# Patient Record
Sex: MALE | URBAN - METROPOLITAN AREA
[De-identification: names, ages, dates, MRNs, and addresses within clinical notes are randomized per-mention and may not be internally consistent; named-entity substitution may affect disease eponyms.]

---

## 2022-06-14 ENCOUNTER — OFFICE (OUTPATIENT)
Dept: URBAN - METROPOLITAN AREA CLINIC 27 | Facility: CLINIC | Age: 57
Setting detail: OPHTHALMOLOGY
End: 2022-06-14
Payer: COMMERCIAL

## 2022-06-14 DIAGNOSIS — H52.4: ICD-10-CM

## 2022-06-14 DIAGNOSIS — H25.13: ICD-10-CM

## 2022-06-14 DIAGNOSIS — H43.813: ICD-10-CM

## 2022-06-14 DIAGNOSIS — H35.373: ICD-10-CM

## 2022-06-14 PROBLEM — H52.7 REFRACTIVE ERROR ; BOTH EYES: Status: ACTIVE | Noted: 2022-06-14

## 2022-06-14 PROCEDURE — 92004 COMPRE OPH EXAM NEW PT 1/>: CPT | Performed by: OPHTHALMOLOGY

## 2022-06-14 PROCEDURE — 92015 DETERMINE REFRACTIVE STATE: CPT | Performed by: OPHTHALMOLOGY

## 2022-06-14 PROCEDURE — 92134 CPTRZ OPH DX IMG PST SGM RTA: CPT | Performed by: OPHTHALMOLOGY

## 2022-06-14 ASSESSMENT — REFRACTION_CURRENTRX
OS_OVR_VA: 20/
OD_AXIS: 09
OD_OVR_VA: 20/
OS_AXIS: 173
OS_ADD: +2.25
OS_SPHERE: -3.25
OD_ADD: +2.50
OS_CYLINDER: +1.00
OD_SPHERE: -3.00
OD_CYLINDER: +1.00

## 2022-06-14 ASSESSMENT — TONOMETRY
OD_IOP_MMHG: 19
OS_IOP_MMHG: 17

## 2022-06-14 ASSESSMENT — KERATOMETRY
OD_K1POWER_DIOPTERS: 38.75
OS_K1POWER_DIOPTERS: 38.50
OS_AXISANGLE_DEGREES: 134
OS_K2POWER_DIOPTERS: 39.00
OD_K2POWER_DIOPTERS: 39.50
OD_AXISANGLE_DEGREES: 72

## 2022-06-14 ASSESSMENT — REFRACTION_AUTOREFRACTION
OS_CYLINDER: +1.75
OD_CYLINDER: +1.00
OD_AXIS: 19
OD_SPHERE: -2.75
OS_AXIS: 169
OS_SPHERE: -3.50

## 2022-06-14 ASSESSMENT — REFRACTION_MANIFEST
OS_ADD: +2.50
OS_AXIS: 170
OD_ADD: +2.50
OD_AXIS: 015
OS_CYLINDER: +1.00
OD_CYLINDER: +1.25
OD_SPHERE: -2.75
OS_SPHERE: -2.50
OD_VA1: 20/20
OS_VA1: 20/20

## 2022-06-14 ASSESSMENT — AXIALLENGTH_DERIVED
OD_AL: 26.307
OS_AL: 26.4182
OD_AL: 26.3677
OS_AL: 26.7271

## 2022-06-14 ASSESSMENT — SPHEQUIV_DERIVED
OD_SPHEQUIV: -2.125
OS_SPHEQUIV: -2
OS_SPHEQUIV: -2.625
OD_SPHEQUIV: -2.25

## 2022-06-14 ASSESSMENT — CONFRONTATIONAL VISUAL FIELD TEST (CVF)
OS_FINDINGS: FULL
OD_FINDINGS: FULL

## 2022-06-14 ASSESSMENT — VISUAL ACUITY
OD_BCVA: 20/20
OS_BCVA: 20/20

## 2024-09-17 PROBLEM — Z00.00 ENCOUNTER FOR PREVENTIVE HEALTH EXAMINATION: Status: ACTIVE | Noted: 2024-09-17

## 2024-09-19 ENCOUNTER — APPOINTMENT (OUTPATIENT)
Dept: FAMILY MEDICINE | Facility: CLINIC | Age: 59
End: 2024-09-19
Payer: COMMERCIAL

## 2024-09-19 VITALS
HEIGHT: 72 IN | TEMPERATURE: 97.9 F | HEART RATE: 90 BPM | WEIGHT: 182 LBS | OXYGEN SATURATION: 100 % | BODY MASS INDEX: 24.65 KG/M2

## 2024-09-19 VITALS — SYSTOLIC BLOOD PRESSURE: 138 MMHG | DIASTOLIC BLOOD PRESSURE: 82 MMHG

## 2024-09-19 DIAGNOSIS — I10 ESSENTIAL (PRIMARY) HYPERTENSION: ICD-10-CM

## 2024-09-19 DIAGNOSIS — E78.00 PURE HYPERCHOLESTEROLEMIA, UNSPECIFIED: ICD-10-CM

## 2024-09-19 DIAGNOSIS — R21 RASH AND OTHER NONSPECIFIC SKIN ERUPTION: ICD-10-CM

## 2024-09-19 DIAGNOSIS — Z78.9 OTHER SPECIFIED HEALTH STATUS: ICD-10-CM

## 2024-09-19 DIAGNOSIS — R19.7 DIARRHEA, UNSPECIFIED: ICD-10-CM

## 2024-09-19 PROCEDURE — 99203 OFFICE O/P NEW LOW 30 MIN: CPT

## 2024-09-19 RX ORDER — CLOTRIMAZOLE AND BETAMETHASONE DIPROPIONATE 10; .5 MG/G; MG/G
1-0.05 CREAM TOPICAL TWICE DAILY
Qty: 1 | Refills: 3 | Status: ACTIVE | COMMUNITY
Start: 2024-09-19 | End: 1900-01-01

## 2024-09-19 RX ORDER — METRONIDAZOLE 250 MG/1
250 TABLET ORAL
Qty: 21 | Refills: 0 | Status: ACTIVE | COMMUNITY
Start: 2024-09-19 | End: 1900-01-01

## 2024-09-19 NOTE — HISTORY OF PRESENT ILLNESS
[FreeTextEntry8] : He is here for a new patient visit. He is originally from [place] and has been living and working in [place] for the past 16 years. He recently moved to [place].  He states that he adopted a cat that has Giardia and he was advised to be tested for this as well. He does not have diarrhea but he feels that his stool is softer than before. He has no fever, nausea, or vomiting.  He also has a rash on his face. He states that he gets this rash every 1-2 years. It is usually triggered by hot, humid weather. He usually uses a combination topical antifungal/steroid and would like a prescription for htis.

## 2024-09-19 NOTE — PHYSICAL EXAM
[No Lymphadenopathy] : no lymphadenopathy [No Edema] : there was no peripheral edema [Soft] : abdomen soft [Non Tender] : non-tender [Non-distended] : non-distended [Normal Bowel Sounds] : normal bowel sounds [No Focal Deficits] : no focal deficits [Normal] : affect was normal and insight and judgment were intact [de-identified] : rash on right cheek, the cheek is moderately swollen

## 2024-09-19 NOTE — HEALTH RISK ASSESSMENT
[0] : 2) Feeling down, depressed, or hopeless: Not at all (0) [PHQ-2 Negative - No further assessment needed] : PHQ-2 Negative - No further assessment needed [Never] : Never [[id number]] : 0

## 2024-09-19 NOTE — ASSESSMENT
[FreeTextEntry1] : He is here to request testing for Giardia since he and his wife adopted a cat who tested positive for this.  He also has a rash on his face which appears to be fungal. He has used an antifungal/steroid cream in the past but has run out and would like a refill.  His initial blood pressure (not documented) was elevated. His repeat BP is improved but still higher than he gets at home. He admits that he has white coat hypertension.

## 2024-09-20 ENCOUNTER — LABORATORY RESULT (OUTPATIENT)
Age: 59
End: 2024-09-20

## 2024-09-21 ENCOUNTER — TRANSCRIPTION ENCOUNTER (OUTPATIENT)
Age: 59
End: 2024-09-21

## 2024-09-25 LAB — DEPRECATED O AND P PREP STL: NORMAL
